# Patient Record
Sex: FEMALE | Race: WHITE | NOT HISPANIC OR LATINO | Employment: FULL TIME | URBAN - NONMETROPOLITAN AREA
[De-identification: names, ages, dates, MRNs, and addresses within clinical notes are randomized per-mention and may not be internally consistent; named-entity substitution may affect disease eponyms.]

---

## 2024-08-23 ENCOUNTER — OFFICE VISIT (OUTPATIENT)
Dept: URGENT CARE | Facility: CLINIC | Age: 24
End: 2024-08-23
Payer: COMMERCIAL

## 2024-08-23 VITALS
OXYGEN SATURATION: 97 % | SYSTOLIC BLOOD PRESSURE: 113 MMHG | BODY MASS INDEX: 38.57 KG/M2 | RESPIRATION RATE: 18 BRPM | DIASTOLIC BLOOD PRESSURE: 78 MMHG | WEIGHT: 240 LBS | TEMPERATURE: 97.7 F | HEIGHT: 66 IN | HEART RATE: 122 BPM

## 2024-08-23 DIAGNOSIS — J06.9 VIRAL URI WITH COUGH: ICD-10-CM

## 2024-08-23 DIAGNOSIS — J02.9 ACUTE PHARYNGITIS, UNSPECIFIED ETIOLOGY: Primary | ICD-10-CM

## 2024-08-23 LAB
POC CORONAVIRUS 2019 BY PCR (COV19): DETECTED
POC FLU A RESULT: NOT DETECTED
POC FLU B RESULT: NOT DETECTED
POC GROUP A STREP, PCR: NOT DETECTED
POC RSV PCR: NOT DETECTED

## 2024-08-23 PROCEDURE — 99213 OFFICE O/P EST LOW 20 MIN: CPT | Performed by: NURSE PRACTITIONER

## 2024-08-23 PROCEDURE — 87651 STREP A DNA AMP PROBE: CPT | Mod: QW | Performed by: NURSE PRACTITIONER

## 2024-08-23 NOTE — PROGRESS NOTES
[facility]  Yuliana TALIB Olmos, APRN-CNP     Visit Note - 8/23/2024 9:50 AM   This note was generated with voice recognition software and may contain errors including spelling, grammar, syntax, and misrecognization of what was dictated.    Patient: Sangita Campbell, MRN: [id number], 24 y.o., female   PCP: Issa Howard PA-C  ------------------------------------  ALLERGIES: No Known Allergies     CURRENT MEDICATIONS:   Current Outpatient Medications   Medication Instructions    busPIRone (BUSPAR) 5 mg, oral, 2 times daily    Enskyce 0.15-0.03 mg tablet 1 tablet, oral, Daily    metoprolol succinate XL (TOPROL-XL) 50 mg, oral, Daily    venlafaxine XR (EFFEXOR-XR) 150 mg, oral, Daily    venlafaxine XR (EFFEXOR-XR) 75 mg, oral, Daily, Do not crush or chew.     ------------------------------------  PAST MEDICAL HX:  Patient Active Problem List   Diagnosis    Acne    Anxiety    Chest discomfort    Chronic fatigue    Cold sore    Depression    Elevated blood pressure reading    Generalized anxiety disorder    Heavy period    Iron deficiency    Iron deficiency anemia    Microcytic anemia    Palpitations    Tachycardia      SURGICAL HX:  History reviewed. No pertinent surgical history.   FAMILY HX:   No pertinent history.   SOCIAL HX:    reports that she has quit smoking. Her smoking use included cigarettes. She has never used smokeless tobacco. Employed- works at a day-hab facility.   ------------------------------------  CHIEF COMPLAINT:   Chief Complaint   Patient presents with    Sore Throat     Sore throat, cough and congestion,fevers, headaches  X 2 days      HISTORY OF PRESENT ILLNESS: The history was obtained from patientBrittnee Quesada is a 24 y.o. female, who presents with a chief complaint of sore throat, a cough (sometimes dry/sometimes productive with green phlegm), headaches, PND, nasal congestion/sinus pressure (clear/yellow mucus), and pressure in both ears - sxs started on Tuesday night. Has  "also had intermittent fevers (Tmax 101.7f). Denies any chills, body aches, abdominal pain, chest pain, wheezing/shortness of breath, rashes, urinary symptoms, nausea/vomiting, and diarrhea. Denies any changes in mental status. No swelling in legs. Appetite is normal but reports it hurts to swallow; is able to drink fluids without difficulty; denies loss of sense of taste or smell. Reports symptoms have gotten worse since onset. Has been taking  Theraflu  without much relief; no other over-the-counter medications or home remedies for symptom management.  A few of her clients at work (works at a [employer] facility) have been ill recently; no other known ill contacts. Has not received the COVID vaccine. Last known COVID infection was in ~2022.  Is a former smoker.  Currently vapes.     REVIEW OF SYSTEMS:  10 systems reviewed negative with exception of history of present illness as listed above.    TODAY'S VITALS: /78   Pulse (!) 122   Temp 36.5 °C (97.7 °F)   Resp 18   Ht 1.676 m (5' 6\")   Wt 109 kg (240 lb)   SpO2 97%   BMI 38.74 kg/m²   Recheck HR: 112 - patient reports has history of Tachycardia - PCP and Cardio are monitoring - reports HR is typically in the 120's.     PHYSICAL EXAMINATION:  General:  Mildly ill-appearing, well nourished female; alert and oriented; in no acute distress. Sitting comfortably on exam table. Non-dyspneic.   Eyes:  Pupils equal, round and reactive to light. No conjunctival erythema; no scleral icterus.   HENT: No frontal or maxillary sinus tenderness; + audible nasal congestion. Airway patent, TMs and ear canals clear/unremarkable bilaterally. Nasal mucosa mildly injected and edematous. Oral mucosa moist. Posterior pharynx mildly injected but without vesicles or oropharyngeal exudate aside from PND. Tonsils 1+ but without exudate. Uvula is midline. Managing oral secretions without difficulty. No trismus.   Neck:  Supple. Mildly tender, mobile anterior cervical lymphadenopathy " bilat. Trachea is midline.  Respiratory:  Respirations easy and unlabored, Breath sounds equal. Lungs are clear to auscultation; no wheezes, rhonchi, or rales; has good air movement throughout. + loose, productive cough noted. Non-dyspneic with ambulation; able to maintain SpO2.  Cardiovascular:  Tachycardic rate, Regular rhythm. Normal S1S2. No m/r/g. No peripheral edema.   Gastrointestinal:  Soft, non-tender, non-distended; no palpable masses or organomegaly. Bowel sounds normoactive.  Musculoskeletal:  Grossly normal; appropriate for age.     Integumentary:  Pink, warm, dry, and intact. No rashes or skin discoloration appreciated. Good skin turgor.  Neurologic:  Alert and oriented, no gross deficits.    Cognition and Speech:  Oriented, Speech clear and coherent.    Psychiatric:  Cooperative, Appropriate mood & affect.       ------------------------------------  Medical Decision Making  LABORATORY or RADIOLOGICAL IMAGING ORDERS/RESULTS:   Strep A/COVID/influenza/RSV PCR tests done - results pending.    IMPRESSION/PLAN:  Course: Worsening; stable    1. Acute pharyngitis, unspecified etiology  2. Viral URI with cough  - POCT Group A Streptococcus, PCR manually resulted  - POCT SARS-COV-2/FLU/RSV PCR SYMPTOMATIC manually resulted    Is tachycardic but reports this is her baseline - following with Cardiology and reports HR is typically in the 120's. Is asymptomatic. Took TheraFlu this AM. No other red flags on exam today. Symptoms consistent with viral URI, but reviewed other potential etiologies, and strep and nasal swab obtained (using proper PPE) for influenza/RSV/COVID-19 testing to err on the side of caution. No antibiotics indicated at this point, but encouraged to continue conservative measures - instructed to push fluids, rest, and to use appropriate over the counter medications as needed for management of symptoms - should avoid decongestants in light of her elevated HR. Advised can review test results on the  portal and office will contact pt within the next 24-48 hours. Reviewed instructions for self-isolation and continued monitoring. Reviewed red flags to monitor for, counseled on potential adverse reactions of treatments, expectations for improvement in sxs, and advised to follow-up with primary care provider in 3-5 days if symptoms persist, or to seek care sooner if worsening or if any additional concerns/red flags develop.  Also urged to continue close follow up with PCP/Cardiology re: tachycardia. Patient agreed with plan of care; questions were encouraged and answered.       ROSE Yen-CNP   Advanced Practice Provider  [facility]

## 2024-08-23 NOTE — LETTER
August 23, 2024     Patient: Sangita Campbell   YOB: 2000   Date of Visit: 8/23/2024       To Whom It May Concern:    Sangita Campbell was seen at [facility] on 8/23/2024. Please excuse Sangita from work/school beginning now and through: 8/24/24 - see below.    ADDITIONAL NOTES AND INSTRUCTIONS  Please self-isolate at home (patient and all household members) until a negative test result is returned. If COVID-19 test is positive, you will need to quarantine until:   - your symptoms are resolving, AND   - your fever has been gone for at least 24 hours, without the use of fever reducing medications.  After quarantining, even if you are feeling better, it is recommended that you use added precautions over the next 5 days. Precautions include: practicing good hygiene measures, wearing a well-fitting mask, keeping a distance from others, and avoiding contact with people who have weakened immune systems.     Flu test, RSV test, and Strep test also done today - If flu test is positive, will need to be feeling better, fever-free x 24 hours, and at least 5 days since onset of symptoms prior to returning to work/school.     If strep test is positive, will need to be on antibiotic x 24 hours AND fever-free x 24 hours prior to returning to work/school.     **If tests are negative, can return to work/school once feeling better and fever-free (without tylenol/ibuprofen) x 24 hours.**    Sincerely,       Yuliana Olmos, APRN-CNP

## 2024-09-18 ENCOUNTER — TELEPHONE (OUTPATIENT)
Dept: PRIMARY CARE | Facility: CLINIC | Age: 24
End: 2024-09-18
Payer: COMMERCIAL

## 2024-09-18 DIAGNOSIS — F41.9 ANXIETY: ICD-10-CM

## 2024-09-18 DIAGNOSIS — F32.A DEPRESSION, UNSPECIFIED DEPRESSION TYPE: ICD-10-CM

## 2024-09-19 RX ORDER — VENLAFAXINE HYDROCHLORIDE 150 MG/1
150 CAPSULE, EXTENDED RELEASE ORAL DAILY
Qty: 90 CAPSULE | Refills: 3 | Status: SHIPPED | OUTPATIENT
Start: 2024-09-19 | End: 2025-09-19

## 2024-10-16 ENCOUNTER — TELEPHONE (OUTPATIENT)
Dept: PRIMARY CARE | Facility: CLINIC | Age: 24
End: 2024-10-16
Payer: COMMERCIAL

## 2024-10-16 DIAGNOSIS — F41.9 ANXIETY: ICD-10-CM

## 2024-10-16 NOTE — TELEPHONE ENCOUNTER
Order Details    Dose: 75 mg Route: oral Frequency: Daily   Dispense Quantity: 90 capsule Refills: 1    Note to Pharmacy: Pt takes this with the venlafaxine 150 mg daily         Sig: Take 1 capsule (75 mg) by mouth once daily. Do not crush or chew.         [facility]. THANK YOU.

## 2024-10-17 RX ORDER — VENLAFAXINE HYDROCHLORIDE 75 MG/1
75 CAPSULE, EXTENDED RELEASE ORAL DAILY
Qty: 90 CAPSULE | Refills: 1 | Status: SHIPPED | OUTPATIENT
Start: 2024-10-17 | End: 2025-10-17

## 2024-11-26 NOTE — PROGRESS NOTES
CHIEF COMPLAINT  Questions regarding BB and pregnancy     HISTORY OF PRESENT ILLNESS    Patient presents and is inquiring about the safety of metoprolol if she would become pregnant. She does report palpations on a daily basis. Her BP is quite high today and patient reports a stressful day at work. She is able to check BP at home.     Cardiovascular hx:    1.Paroxysmal atrial tachycardia:  -Patient previously examined by Dr. Eugene where she was medically managed with toprol XL 50mg daily. If toprol therapy ineffective, can consider switching to propranolol or consider an EPS +/- SVT ablation. In addition to pharmacological interventions, the patient was also instructed to increase her fluid intake to 3L per day, increase aerobic exercise, and increase dietary salt intake  -Currently on Toprol XL 50mg daily       Cardiovascular testin.Echo ()-normal biventricular function         Past Medical, Surgical, and Family History reviewed and updated in chart.     Reviewed all medications by prescribing practitioner or clinical pharmacist (such as prescriptions, OTCs, herbal therapies and supplements) and documented in the medical record.    Past Medical History  Past Medical History:   Diagnosis Date    Other conditions influencing health status     Menstruation    Other specified anxiety disorders 2022    Depression with anxiety       Social History  Social History     Tobacco Use    Smoking status: Every Day    Smokeless tobacco: Never    Tobacco comments:     VAPE   Vaping Use    Vaping status: Every Day    Substances: Nicotine   Substance Use Topics    Alcohol use: Yes     Comment: occasional    Drug use: Not on file       Family History     Family History   Problem Relation Name Age of Onset    Basal cell carcinoma Mother      Supraventricular tachycardia Father      Other (pat) Father      Heart attack Father's Brother      Colon cancer Maternal Grandmother      Stroke Maternal  "Grandmother          Allergies:  No Known Allergies     Outpatient Medications:  Current Outpatient Medications   Medication Instructions    busPIRone (BUSPAR) 5 mg, oral, 2 times daily    Enskyce 0.15-0.03 mg tablet 1 tablet, oral, Daily    metoprolol succinate XL (TOPROL-XL) 50 mg, oral, Daily    metoprolol succinate XL (TOPROL-XL) 50 mg, oral, Daily, Do not crush or chew.    metoprolol succinate XL (TOPROL-XL) 25 mg, oral, Daily, Do not crush or chew.    venlafaxine XR (EFFEXOR-XR) 150 mg, oral, Daily    venlafaxine XR (EFFEXOR-XR) 75 mg, oral, Daily, Do not crush or chew.          Labs:   CMP:  Recent Labs     09/12/23  1539 12/20/22  1049 03/01/22  0940 01/03/22  1406    135*  --  139   K 3.8 4.0  --  3.8    103  --  106   CO2 26 22  --  25   ANIONGAP 13 14  --  12   BUN 14 10  --  7   CREATININE 0.52 0.46*  --  0.55   MG  --   --  2.15  --      Recent Labs     09/12/23  1539 12/20/22  1049 01/03/22  1406   ALBUMIN 4.2 4.2 4.3   ALKPHOS 107 83 87   ALT 52* 31 39   AST 29 29 16   BILITOT 0.4 0.3 0.4     CBC:  Recent Labs     09/12/23  1539 01/10/23  1549 12/20/22  1049 08/09/22  0934 04/04/22  0955   WBC 10.2 8.8 13.5* 8.2 9.3   HGB 14.2 14.8 15.2 14.0 12.2   HCT 44.3 45.2 46.5* 42.4 38.9    365 376 440 510*   MCV 94 89 89 86 75*     COAG: No results for input(s): \"PTT\", \"INR\", \"HAUF\", \"DDIMERVTE\", \"HAPTOGLOBIN\", \"FIBRINOGEN\" in the last 19110 hours.  ABO: No results for input(s): \"ABO\" in the last 78333 hours.  HEME/ENDO:  Recent Labs     12/08/23  1620 09/12/23  1539 01/10/23  1549 08/09/22  0934 05/05/22  0935 04/04/22  0955 03/01/22  0940 01/03/22  1406   FERRITIN  --  33 26  --   --   --  8  --    IRONSAT  --  12* 60* NOT CALC. 9*   < > 5*  --    TSH 3.31  --   --  2.25  --   --   --  1.42    < > = values in this interval not displayed.      CARDIAC: No results for input(s): \"LDH\", \"CKMB\", \"TROPHS\", \"BNP\" in the last 26100 hours.    No lab exists for component: \"CK\", \"CKMBP\"  Recent Labs " "    01/03/22  1406   CHOL 162   LDLF 91   HDL 46.0   TRIG 123     MICRO: No results for input(s): \"ESR\", \"CRP\", \"PROCAL\" in the last 45013 hours.  No results found for the last 90 days.    Notable Studies: imaging personally reviewed   EKG:No results found for this or any previous visit (from the past 4464 hours).  Echocardiogram:   [place] [id number]  Phone [phone], Fax [phone]    TRANSTHORACIC ECHOCARDIOGRAM REPORT      Patient Name:     KORY ARTHUR ELVIRA Reading Physician:   [id number] Maciel Xiong MD  Study Date:       2/21/2022         Referring Physician: [id number] REINA MATSON MD  MRN/PID:          [id number]          PCP:  Accession/Order#: [id number]      Department Location: [facility]  YOB: 2000          Fellow:  Gender:           F                 Nurse:  Admit Date:                         Sonographer:         Karlie Browning RVT, [facility]  Admission Status: Outpatient        Additional Staff:  Height:           167.64 cm         CC Report to:  Weight:           77.11 kg          Study Type:          Echocardiogram  BSA:              1.87 m2  Blood Pressure: 122 /68 mmHg    Diagnosis/ICD: R00.2-Palpitations; R07.9-Chest pain, unspecified  Indication:    CP,Palps  Procedure/CPT: Echo Complete w Full [id number]    Patient History:  Pertinent History: No previous echo.    Study Detail: The following Echo studies were performed: 2D, M-Mode, Doppler and  color flow. Agitated saline used as a contrast agent for  intraseptal flow evaluation. The patient was awake.      PHYSICIAN INTERPRETATION:  Left Ventricle: The left ventricular systolic function is normal, with an estimated ejection fraction of 55-60%. There are no regional wall motion abnormalities. The left ventricular cavity size is normal. Spectral Doppler shows a normal pattern of left ventricular diastolic filling.  Left Atrium: The " left atrium is normal in size. Bubble study is consistent with a patent foramen ovale. Negative contrast in the right atrium suggests bidirectional shunt.  Right Ventricle: The right ventricle is normal in size. There is normal right ventricular global systolic function.  Right Atrium: The right atrium is normal in size.  Aortic Valve: The aortic valve was not well visualized. There is no evidence of aortic valve regurgitation. The peak instantaneous gradient of the aortic valve is 5.1 mmHg. The mean gradient of the aortic valve is 3.0 mmHg. Aortic valve appears open well in systole. Number of leaflets could not be adequately visualized.  Mitral Valve: The mitral valve is normal in structure. There is no evidence of mitral valve regurgitation.  Tricuspid Valve: The tricuspid valve is structurally normal. No evidence of tricuspid regurgitation.  Pulmonic Valve: The pulmonic valve is not well visualized. There is trace pulmonic valve regurgitation.  Pericardium: There is no pericardial effusion noted.  Aorta: The aortic root is normal.  Systemic Veins: The inferior vena cava appears to be of normal size. There is IVC inspiratory collapse greater than 50%.      CONCLUSIONS:  1. The left ventricular systolic function is normal with a 55-60% estimated ejection fraction.  2. Bubble study is consistent with a patent foramen ovale. Negative contrast in the right atrium suggests bidirectional shunt.    QUANTITATIVE DATA SUMMARY:  2D MEASUREMENTS:  Normal Ranges:  Ao Root d:     2.60 cm   (2.0-3.7cm)  LAs:           3.40 cm   (2.7-4.0cm)  IVSd:          0.79 cm   (0.6-1.1cm)  LVPWd:         0.73 cm   (0.6-1.1cm)  LVIDd:         5.27 cm   (3.9-5.9cm)  LVIDs:         3.57 cm  LV Mass Index: 74.3 g/m2  LV % FS        32.3 %    LA VOLUME:  Normal Ranges:  LA Vol A4C:        29.2 ml    (22+/-6mL/m2)  LA Vol A2C:        71.5 ml  LA Vol BP:         49.1 ml  LA Vol Index A4C:  15.6ml/m2  LA Vol Index A2C:  38.3 ml/m2  LA Vol Index  BP:   26.3 ml/m2  LA Area A4C:       12.5 cm2  LA Area A2C:       18.2 cm2  LA Major Axis A4C: 4.6 cm  LA Major Axis A2C: 3.9 cm  LA Volume Index:   36.2 ml/m2  LA Vol A4C:        23.3 ml  LA Vol A2C:        67.7 ml    M-MODE MEASUREMENTS:  Normal Ranges:  AoV Exc: 2.10 cm (1.5-2.5cm)    AORTA MEASUREMENTS:  Normal Ranges:  AoV Exc: 2.10 cm (1.5-2.5cm)    LV SYSTOLIC FUNCTION BY 2D PLANIMETRY (MOD):  Normal Ranges:  EF-A4C View: 59.6 % (>55%)  EF-A2C View: 52.4 %  EF-Biplane:  55.6 %    LV DIASTOLIC FUNCTION:  Normal Ranges:  MV Peak E:    0.77 m/s (0.7-1.2 m/s)  MV Peak A:    0.75 m/s (0.42-0.7 m/s)  E/A Ratio:    1.03     (1.0-2.2)  MV e'         0.12 m/s (>8.0)  MV lateral e' 0.16 m/s  MV medial e'  0.12 m/s  E/e' Ratio:   6.43     (<8.0)    MITRAL VALVE:  Normal Ranges:  MV DT: 183 msec (150-240msec)    AORTIC VALVE:  Normal Ranges:  AoV Vmax:                1.13 m/s (<1.7m/s)  AoV Peak P.1 mmHg (<20mmHg)  AoV Mean PG:             3.0 mmHg (1.7-11.5mmHg)  LVOT Max Ruel:            0.75 m/s (<1.1m/s)  AoV VTI:                 25.80 cm (18-25cm)  LVOT VTI:                15.60 cm  LVOT Diameter:           2.10 cm  (1.8-2.4cm)  AoV Area, VTI:           2.09 cm2 (2.5-5.5cm2)  AoV Area,Vmax:           2.31 cm2 (2.5-4.5cm2)  AoV Dimensionless Index: 0.60    RIGHT VENTRICLE:  RV 1   3.89 cm  RV 2   3.13 cm  RV 3   6.69 cm  TAPSE: 16.5 mm    PULMONIC VALVE:  Normal Ranges:  PV Accel Time: 141 msec (>120ms)  PV Max Ruel:    1.3 m/s  (0.6-0.9m/s)  PV Max P.2 mmHg      [id number] Maciel Xiong MD  Electronically signed on 2022 at 12:00:08 PM      Stress Testing: No results found for this or any previous visit from the past 1825 days.    Cardiac Catheterization: No results found for this or any previous visit from the past 1825 days.  No results found for this or any previous visit from the past 3650 days.         REVIEW OF SYSTEMS  A 10-point system review was completed and was negative except as  noted in the HPI.      VITALS  Vitals:    24 1530   BP: (!) 142/102   Pulse: (!) 112   SpO2: 99%       PHYSICAL EXAM  General: awake, alert and oriented. No acute distress.   Skin: Skin is warm, dry and intact without rashes or lesions.  HEENT: normocephalic, atraumatic; conjunctivae are clear without exudates or hemorrhage. Sclera is non-icteric. Eyelids are normal in appearance without swelling or lesions. Hearing intact. Nares are patent bilaterally. Moist mucous membranes.   Cardiovascular: heart rate and rhythm are normal. No murmurs, gallops, or rubs are auscultated. S1 and S2 are heard and are of normal intensity.  Respiratory: bilateral lung sounds clear to auscultations without rales, rhonchi, or wheezes. No accessory muscle use or stridor  Musculoskeletal: ROM intact, no deformities  Extremities: no swelling or erythema  Neurological: no focal deficits; gait steady  Psychiatric: appropriate mood and affect; good judgment and insight          ASSESSMENT AND PLAN  Assessment/Plan   Diagnoses and all orders for this visit:  Encounter for medication counseling  Palpitations  -We discussed the safety profile of metoprolol in pregnancy which comes back as a pregnancy category C with a moderately low risk of accumulation in the ; medication does cross the placenta. The medication can be taken with pregnancy with relatively low risk  -Given she still experiences palpitations, we will increase her Toprol to 75mg daily.   -She may have some underlying POTS given her spike in her HR with position changes. We discussed conservative treatment and avoiding triggers which she verbalized understanding   -IOEKG showing NSR with rate of 92 bpm   -Patient is hypertensive today. I want patent to monitor BP at home, record results, and the office will call her in 1 week for me to evaluate readings. If her readings remain above goal, medication may be warranted          RTC: PRN      Thank you for allowing me to  participate in the care of this patient. Please reach me out if you have any questions or if you need any clarifications regarding the patient's care.    Izabela Damian DNP, APRN, FNP-C  Division of Cardiovascular Medicine  [facility]

## 2024-11-27 ENCOUNTER — APPOINTMENT (OUTPATIENT)
Dept: CARDIOLOGY | Facility: CLINIC | Age: 24
End: 2024-11-27
Payer: COMMERCIAL

## 2024-11-27 VITALS
HEART RATE: 112 BPM | BODY MASS INDEX: 40.77 KG/M2 | DIASTOLIC BLOOD PRESSURE: 102 MMHG | OXYGEN SATURATION: 99 % | HEIGHT: 65 IN | WEIGHT: 244.7 LBS | SYSTOLIC BLOOD PRESSURE: 142 MMHG

## 2024-11-27 DIAGNOSIS — Z71.89 ENCOUNTER FOR MEDICATION COUNSELING: Primary | ICD-10-CM

## 2024-11-27 DIAGNOSIS — R00.2 PALPITATIONS: ICD-10-CM

## 2024-11-27 PROCEDURE — 99214 OFFICE O/P EST MOD 30 MIN: CPT

## 2024-11-27 PROCEDURE — 93000 ELECTROCARDIOGRAM COMPLETE: CPT

## 2024-11-27 PROCEDURE — 3008F BODY MASS INDEX DOCD: CPT

## 2024-11-27 RX ORDER — METOPROLOL SUCCINATE 25 MG/1
25 TABLET, EXTENDED RELEASE ORAL DAILY
Qty: 90 TABLET | Refills: 3 | Status: SHIPPED | OUTPATIENT
Start: 2024-11-27 | End: 2025-11-27

## 2024-11-27 RX ORDER — METOPROLOL SUCCINATE 50 MG/1
50 TABLET, EXTENDED RELEASE ORAL DAILY
Qty: 90 TABLET | Refills: 3 | Status: SHIPPED | OUTPATIENT
Start: 2024-11-27 | End: 2025-11-27

## 2025-01-23 ENCOUNTER — OFFICE VISIT (OUTPATIENT)
Dept: PRIMARY CARE | Facility: CLINIC | Age: 25
End: 2025-01-23
Payer: COMMERCIAL

## 2025-01-23 VITALS
SYSTOLIC BLOOD PRESSURE: 128 MMHG | OXYGEN SATURATION: 97 % | DIASTOLIC BLOOD PRESSURE: 70 MMHG | HEART RATE: 123 BPM | BODY MASS INDEX: 39.82 KG/M2 | HEIGHT: 65 IN | WEIGHT: 239 LBS | TEMPERATURE: 97 F

## 2025-01-23 DIAGNOSIS — J06.9 UPPER RESPIRATORY TRACT INFECTION, UNSPECIFIED TYPE: ICD-10-CM

## 2025-01-23 DIAGNOSIS — J01.40 ACUTE NON-RECURRENT PANSINUSITIS: Primary | ICD-10-CM

## 2025-01-23 PROCEDURE — 87636 SARSCOV2 & INF A&B AMP PRB: CPT

## 2025-01-23 PROCEDURE — 99213 OFFICE O/P EST LOW 20 MIN: CPT

## 2025-01-23 PROCEDURE — 3008F BODY MASS INDEX DOCD: CPT

## 2025-01-23 PROCEDURE — 1036F TOBACCO NON-USER: CPT

## 2025-01-23 RX ORDER — AZITHROMYCIN 250 MG/1
TABLET, FILM COATED ORAL
Qty: 6 TABLET | Refills: 0 | Status: SHIPPED | OUTPATIENT
Start: 2025-01-23 | End: 2025-01-28

## 2025-01-23 ASSESSMENT — ENCOUNTER SYMPTOMS
GASTROINTESTINAL NEGATIVE: 1
CONSTITUTIONAL NEGATIVE: 1
CARDIOVASCULAR NEGATIVE: 1
RESPIRATORY NEGATIVE: 1

## 2025-01-23 ASSESSMENT — PATIENT HEALTH QUESTIONNAIRE - PHQ9
2. FEELING DOWN, DEPRESSED OR HOPELESS: NOT AT ALL
SUM OF ALL RESPONSES TO PHQ9 QUESTIONS 1 AND 2: 0
1. LITTLE INTEREST OR PLEASURE IN DOING THINGS: NOT AT ALL

## 2025-01-23 NOTE — PROGRESS NOTES
"Subjective   Patient ID: Sangita Campbell is a 24 y.o. female who presents for pt c/o HA, sore throat, congestion x 3 days.    HPI   3 days of symptoms, started as severe sore throat, sinus congestion, head pressure, HA.     Review of Systems   Constitutional: Negative.    Respiratory: Negative.     Cardiovascular: Negative.    Gastrointestinal: Negative.        Objective   /70   Pulse (!) 123   Temp 36.1 °C (97 °F)   Ht 1.651 m (5' 5\")   Wt 108 kg (239 lb)   SpO2 97%   BMI 39.77 kg/m²     Physical Exam  Constitutional:       General: She is not in acute distress.     Appearance: Normal appearance. She is not ill-appearing.   HENT:      Head: Normocephalic and atraumatic.      Nose: Congestion present.      Mouth/Throat:      Pharynx: Posterior oropharyngeal erythema present. No oropharyngeal exudate.   Eyes:      Extraocular Movements: Extraocular movements intact.      Conjunctiva/sclera: Conjunctivae normal.   Cardiovascular:      Rate and Rhythm: Normal rate.   Pulmonary:      Effort: Pulmonary effort is normal.   Abdominal:      General: There is no distension.   Musculoskeletal:         General: Normal range of motion.      Cervical back: Normal range of motion.   Skin:     General: Skin is warm and dry.   Neurological:      General: No focal deficit present.      Mental Status: She is alert and oriented to person, place, and time.   Psychiatric:         Mood and Affect: Mood normal.         Behavior: Behavior normal.         Thought Content: Thought content normal.         Judgment: Judgment normal.         Assessment/Plan        Swabbed for COVID/flu  Rx Zpack, advised wait until swab results back  Otc symptomatic control  Follow up prn  "

## 2025-01-24 LAB
FLUAV RNA RESP QL NAA+PROBE: NOT DETECTED
FLUBV RNA RESP QL NAA+PROBE: NOT DETECTED
SARS-COV-2 RNA RESP QL NAA+PROBE: NOT DETECTED

## 2025-03-06 ENCOUNTER — APPOINTMENT (OUTPATIENT)
Dept: PRIMARY CARE | Facility: CLINIC | Age: 25
End: 2025-03-06
Payer: COMMERCIAL

## 2025-03-06 VITALS
WEIGHT: 239 LBS | DIASTOLIC BLOOD PRESSURE: 82 MMHG | HEIGHT: 65 IN | SYSTOLIC BLOOD PRESSURE: 122 MMHG | BODY MASS INDEX: 39.82 KG/M2 | OXYGEN SATURATION: 99 % | HEART RATE: 95 BPM

## 2025-03-06 DIAGNOSIS — E61.1 IRON DEFICIENCY: Primary | ICD-10-CM

## 2025-03-06 DIAGNOSIS — R00.0 TACHYCARDIA: ICD-10-CM

## 2025-03-06 DIAGNOSIS — F41.9 ANXIETY: ICD-10-CM

## 2025-03-06 PROCEDURE — 1036F TOBACCO NON-USER: CPT

## 2025-03-06 PROCEDURE — 99214 OFFICE O/P EST MOD 30 MIN: CPT

## 2025-03-06 PROCEDURE — 3008F BODY MASS INDEX DOCD: CPT

## 2025-03-06 RX ORDER — VENLAFAXINE HYDROCHLORIDE 37.5 MG/1
37.5 CAPSULE, EXTENDED RELEASE ORAL DAILY
Qty: 90 CAPSULE | Refills: 1 | Status: SHIPPED | OUTPATIENT
Start: 2025-03-06 | End: 2025-09-02

## 2025-03-06 RX ORDER — BUSPIRONE HYDROCHLORIDE 5 MG/1
5 TABLET ORAL 2 TIMES DAILY
Qty: 180 TABLET | Refills: 3 | Status: SHIPPED | OUTPATIENT
Start: 2025-03-06 | End: 2026-03-06

## 2025-03-06 RX ORDER — VENLAFAXINE HYDROCHLORIDE 75 MG/1
75 CAPSULE, EXTENDED RELEASE ORAL DAILY
Qty: 90 CAPSULE | Refills: 1 | Status: SHIPPED | OUTPATIENT
Start: 2025-03-06 | End: 2025-09-02

## 2025-03-06 RX ORDER — NYSTATIN 100000 [USP'U]/ML
SUSPENSION ORAL
COMMUNITY
Start: 2025-03-02

## 2025-03-06 ASSESSMENT — ENCOUNTER SYMPTOMS
CARDIOVASCULAR NEGATIVE: 1
RESPIRATORY NEGATIVE: 1
GASTROINTESTINAL NEGATIVE: 1
CONSTITUTIONAL NEGATIVE: 1

## 2025-03-06 ASSESSMENT — PATIENT HEALTH QUESTIONNAIRE - PHQ9
SUM OF ALL RESPONSES TO PHQ9 QUESTIONS 1 AND 2: 0
1. LITTLE INTEREST OR PLEASURE IN DOING THINGS: NOT AT ALL
2. FEELING DOWN, DEPRESSED OR HOPELESS: NOT AT ALL

## 2025-03-06 NOTE — PROGRESS NOTES
"Subjective   Patient ID: Sangita Campbell is a 25 y.o. female who presents for pt here for med check.    HPI   ANXIETY/DEPRESSION: Currently weaning off Effexor, has been taking 150mg daily for 1 month now. Buspar still helpful. SHANE 7/PHQ 2.  TACHYCARDIA/HTN: Has seen Izabela and increased to 75 Toprol daily, she is tracking BP currently. Possibly underlying POTs, staying well hydrated, advised aerobic exercise. Just started her increased dose about 2 days ago, rate and pressure seem better today.  LOW IRON: Chronic, difficulty to tolerate iron, taking gummies currently.     Following with GYN in [place], she does have heavy menstruals, vaginal US unremarkable, needs to get second opinion.    Review of Systems   Constitutional: Negative.    Respiratory: Negative.     Cardiovascular: Negative.    Gastrointestinal: Negative.        Objective   /82   Pulse 95   Ht 1.651 m (5' 5\")   Wt 108 kg (239 lb)   SpO2 99%   BMI 39.77 kg/m²     Physical Exam  Constitutional:       General: She is not in acute distress.     Appearance: Normal appearance. She is not ill-appearing.   HENT:      Head: Normocephalic and atraumatic.   Eyes:      Extraocular Movements: Extraocular movements intact.      Conjunctiva/sclera: Conjunctivae normal.   Cardiovascular:      Rate and Rhythm: Normal rate.   Pulmonary:      Effort: Pulmonary effort is normal.   Abdominal:      General: There is no distension.   Musculoskeletal:         General: Normal range of motion.      Cervical back: Normal range of motion.   Skin:     General: Skin is warm and dry.   Neurological:      General: No focal deficit present.      Mental Status: She is alert and oriented to person, place, and time.   Psychiatric:         Mood and Affect: Mood normal.         Behavior: Behavior normal.         Thought Content: Thought content normal.         Judgment: Judgment normal.         Assessment/Plan        Nonfasting labs today.  Continue wean off Effexor, go to " 112.5mg daily.  No other medication changes today.  Follow up 3 months or sooner prn.

## 2025-03-07 LAB
ALBUMIN SERPL-MCNC: 3.9 G/DL (ref 3.6–5.1)
ALP SERPL-CCNC: 91 U/L (ref 31–125)
ALT SERPL-CCNC: 33 U/L (ref 6–29)
ANION GAP SERPL CALCULATED.4IONS-SCNC: 10 MMOL/L (CALC) (ref 7–17)
AST SERPL-CCNC: 28 U/L (ref 10–30)
BILIRUB SERPL-MCNC: 0.3 MG/DL (ref 0.2–1.2)
BUN SERPL-MCNC: 15 MG/DL (ref 7–25)
CALCIUM SERPL-MCNC: 8.8 MG/DL (ref 8.6–10.2)
CHLORIDE SERPL-SCNC: 104 MMOL/L (ref 98–110)
CO2 SERPL-SCNC: 24 MMOL/L (ref 20–32)
CREAT SERPL-MCNC: 0.4 MG/DL (ref 0.5–0.96)
EGFRCR SERPLBLD CKD-EPI 2021: 142 ML/MIN/1.73M2
ERYTHROCYTE [DISTWIDTH] IN BLOOD BY AUTOMATED COUNT: 13.1 % (ref 11–15)
FERRITIN SERPL-MCNC: 25 NG/ML (ref 16–154)
GLUCOSE SERPL-MCNC: 88 MG/DL (ref 65–139)
HCT VFR BLD AUTO: 42.8 % (ref 35–45)
HGB BLD-MCNC: 14.1 G/DL (ref 11.7–15.5)
IRON SATN MFR SERPL: 11 % (CALC) (ref 16–45)
IRON SERPL-MCNC: 44 MCG/DL (ref 40–190)
MCH RBC QN AUTO: 27.8 PG (ref 27–33)
MCHC RBC AUTO-ENTMCNC: 32.9 G/DL (ref 32–36)
MCV RBC AUTO: 84.4 FL (ref 80–100)
PLATELET # BLD AUTO: 428 THOUSAND/UL (ref 140–400)
PMV BLD REES-ECKER: 9 FL (ref 7.5–12.5)
POTASSIUM SERPL-SCNC: 4 MMOL/L (ref 3.5–5.3)
PROT SERPL-MCNC: 7.1 G/DL (ref 6.1–8.1)
RBC # BLD AUTO: 5.07 MILLION/UL (ref 3.8–5.1)
SODIUM SERPL-SCNC: 138 MMOL/L (ref 135–146)
TIBC SERPL-MCNC: 417 MCG/DL (CALC) (ref 250–450)
WBC # BLD AUTO: 9.2 THOUSAND/UL (ref 3.8–10.8)

## 2025-05-15 ENCOUNTER — APPOINTMENT (OUTPATIENT)
Dept: PRIMARY CARE | Facility: CLINIC | Age: 25
End: 2025-05-15
Payer: COMMERCIAL

## 2025-05-15 VITALS
WEIGHT: 238 LBS | DIASTOLIC BLOOD PRESSURE: 82 MMHG | BODY MASS INDEX: 39.65 KG/M2 | HEIGHT: 65 IN | HEART RATE: 92 BPM | SYSTOLIC BLOOD PRESSURE: 124 MMHG | OXYGEN SATURATION: 96 %

## 2025-05-15 DIAGNOSIS — R00.2 PALPITATIONS: ICD-10-CM

## 2025-05-15 DIAGNOSIS — E61.1 IRON DEFICIENCY: Primary | ICD-10-CM

## 2025-05-15 DIAGNOSIS — R00.0 TACHYCARDIA: ICD-10-CM

## 2025-05-15 DIAGNOSIS — F41.9 ANXIETY: ICD-10-CM

## 2025-05-15 DIAGNOSIS — F32.A DEPRESSION, UNSPECIFIED DEPRESSION TYPE: ICD-10-CM

## 2025-05-15 PROCEDURE — 99214 OFFICE O/P EST MOD 30 MIN: CPT

## 2025-05-15 PROCEDURE — 3008F BODY MASS INDEX DOCD: CPT

## 2025-05-15 PROCEDURE — 1036F TOBACCO NON-USER: CPT

## 2025-05-15 RX ORDER — VENLAFAXINE HYDROCHLORIDE 37.5 MG/1
37.5 CAPSULE, EXTENDED RELEASE ORAL DAILY
Qty: 90 CAPSULE | Refills: 1 | Status: SHIPPED | OUTPATIENT
Start: 2025-05-15 | End: 2025-11-11

## 2025-05-15 RX ORDER — METOPROLOL SUCCINATE 50 MG/1
50 TABLET, EXTENDED RELEASE ORAL DAILY
Qty: 90 TABLET | Refills: 3 | Status: SHIPPED | OUTPATIENT
Start: 2025-05-15 | End: 2026-05-15

## 2025-05-15 RX ORDER — METOPROLOL SUCCINATE 25 MG/1
25 TABLET, EXTENDED RELEASE ORAL DAILY
Qty: 90 TABLET | Refills: 3 | Status: SHIPPED | OUTPATIENT
Start: 2025-05-15 | End: 2026-05-15

## 2025-05-15 RX ORDER — BUSPIRONE HYDROCHLORIDE 10 MG/1
10 TABLET ORAL 3 TIMES DAILY
Qty: 270 TABLET | Refills: 3 | Status: SHIPPED | OUTPATIENT
Start: 2025-05-15 | End: 2026-05-15

## 2025-05-15 RX ORDER — HYDROXYZINE HYDROCHLORIDE 10 MG/1
10 TABLET, FILM COATED ORAL DAILY PRN
Qty: 10 TABLET | Refills: 0 | Status: SHIPPED | OUTPATIENT
Start: 2025-05-15 | End: 2025-05-25

## 2025-05-15 ASSESSMENT — ENCOUNTER SYMPTOMS
CARDIOVASCULAR NEGATIVE: 1
CONSTITUTIONAL NEGATIVE: 1
RESPIRATORY NEGATIVE: 1
GASTROINTESTINAL NEGATIVE: 1

## 2025-05-15 NOTE — PROGRESS NOTES
"Subjective   Patient ID: Sangita Campbell is a 25 y.o. female who presents for pt here for med check  (Pt here to discuss medication change for anxiety ).    HPI   ANXIETY/DEPRESSION: Currently weaning off Effexor, has been taking 75mg daily for about 1 month now. Has been having significantly increased anxiety as she is weaning off Effexor. SHANE 18. Buspar minimally helpful at current dose.  TACHYCARDIA/HTN: Has seen Izabela and increased to 75 Toprol daily, she is tracking BP currently. Possibly underlying POTs, staying well hydrated, advised aerobic exercise. Just started her increased dose about 2 days ago, rate and pressure seem better today.  LOW IRON: Chronic, difficulty to tolerate iron, taking gummies currently.    Will have trip soon and lots of anxiety with riding in car.     Following with GYN in [place], she does have heavy menstruals, vaginal US unremarkable, needs to get second opinion.    Review of Systems   Constitutional: Negative.    Respiratory: Negative.     Cardiovascular: Negative.    Gastrointestinal: Negative.        Objective   /82   Pulse 92   Ht 1.651 m (5' 5\")   Wt 108 kg (238 lb)   SpO2 96%   BMI 39.61 kg/m²     Physical Exam  Constitutional:       General: She is not in acute distress.     Appearance: Normal appearance. She is not ill-appearing.   HENT:      Head: Normocephalic and atraumatic.   Eyes:      Extraocular Movements: Extraocular movements intact.      Conjunctiva/sclera: Conjunctivae normal.   Cardiovascular:      Rate and Rhythm: Normal rate.   Pulmonary:      Effort: Pulmonary effort is normal.   Abdominal:      General: There is no distension.   Musculoskeletal:         General: Normal range of motion.      Cervical back: Normal range of motion.   Skin:     General: Skin is warm and dry.   Neurological:      General: No focal deficit present.      Mental Status: She is alert and oriented to person, place, and time.   Psychiatric:         Mood and Affect: Mood " normal.         Behavior: Behavior normal.         Thought Content: Thought content normal.         Judgment: Judgment normal.         Assessment/Plan        Increase Buspar to 30mg daily.  Continue Effexor wean.  Rx hydroxyzine prn travel anxiety.  No other medication changes today.  Will recheck iron levels at follow up.  Follow up 3 months or sooner prn.

## 2025-06-20 ENCOUNTER — APPOINTMENT (OUTPATIENT)
Dept: PRIMARY CARE | Facility: CLINIC | Age: 25
End: 2025-06-20
Payer: COMMERCIAL

## 2025-07-30 ENCOUNTER — OFFICE VISIT (OUTPATIENT)
Dept: PRIMARY CARE | Facility: CLINIC | Age: 25
End: 2025-07-30
Payer: COMMERCIAL

## 2025-07-30 VITALS
OXYGEN SATURATION: 98 % | BODY MASS INDEX: 40.62 KG/M2 | HEART RATE: 108 BPM | DIASTOLIC BLOOD PRESSURE: 77 MMHG | HEIGHT: 65 IN | SYSTOLIC BLOOD PRESSURE: 118 MMHG | WEIGHT: 243.8 LBS

## 2025-07-30 DIAGNOSIS — F32.A DEPRESSION, UNSPECIFIED DEPRESSION TYPE: ICD-10-CM

## 2025-07-30 DIAGNOSIS — F41.9 ANXIETY: ICD-10-CM

## 2025-07-30 DIAGNOSIS — Z20.828 HERPES EXPOSURE: Primary | ICD-10-CM

## 2025-07-30 PROCEDURE — 1036F TOBACCO NON-USER: CPT | Performed by: NURSE PRACTITIONER

## 2025-07-30 PROCEDURE — 99214 OFFICE O/P EST MOD 30 MIN: CPT | Performed by: NURSE PRACTITIONER

## 2025-07-30 PROCEDURE — 3008F BODY MASS INDEX DOCD: CPT | Performed by: NURSE PRACTITIONER

## 2025-07-30 RX ORDER — SERTRALINE HYDROCHLORIDE 25 MG/1
25 TABLET, FILM COATED ORAL DAILY
Qty: 30 TABLET | Refills: 1 | Status: SHIPPED | OUTPATIENT
Start: 2025-07-30 | End: 2025-09-28

## 2025-07-30 ASSESSMENT — ENCOUNTER SYMPTOMS
NAUSEA: 0
DIARRHEA: 0
DYSPHORIC MOOD: 1
FEVER: 0
ABDOMINAL PAIN: 0
PALPITATIONS: 0
NERVOUS/ANXIOUS: 1
VOMITING: 0
ABDOMINAL DISTENTION: 0
SHORTNESS OF BREATH: 0
CONSTIPATION: 0
WHEEZING: 0
COUGH: 0

## 2025-07-30 ASSESSMENT — ANXIETY QUESTIONNAIRES
3. WORRYING TOO MUCH ABOUT DIFFERENT THINGS: NEARLY EVERY DAY
5. BEING SO RESTLESS THAT IT IS HARD TO SIT STILL: NEARLY EVERY DAY
4. TROUBLE RELAXING: NEARLY EVERY DAY
6. BECOMING EASILY ANNOYED OR IRRITABLE: NEARLY EVERY DAY
GAD7 TOTAL SCORE: 21
2. NOT BEING ABLE TO STOP OR CONTROL WORRYING: NEARLY EVERY DAY
1. FEELING NERVOUS, ANXIOUS, OR ON EDGE: NEARLY EVERY DAY
IF YOU CHECKED OFF ANY PROBLEMS ON THIS QUESTIONNAIRE, HOW DIFFICULT HAVE THESE PROBLEMS MADE IT FOR YOU TO DO YOUR WORK, TAKE CARE OF THINGS AT HOME, OR GET ALONG WITH OTHER PEOPLE: EXTREMELY DIFFICULT
7. FEELING AFRAID AS IF SOMETHING AWFUL MIGHT HAPPEN: NEARLY EVERY DAY

## 2025-07-30 NOTE — PROGRESS NOTES
Subjective   Patient ID: Sangita Campbell is a 25 y.o. female who presents for Stopped medication (Effexor: having mood swings, tired, anxiety wants to see psychiatrist for medication management. [id number]:21).  24 yo female patient presents for depression/anxiety.  States she tapered herself off of Effexor but she continues with the symptoms.  Denies suicidal or homicidal thoughts or ideas.  Denies any plans.  States she believes she took Zoloft when she was younger.  She would like to get pregnant in the future.  She is tearful today.  States she found out she was exposed to previous boyfriend who has herpes and she would like tested today.  She has no symptoms of herpes or breakouts and has been with her  x 3 years.  States her  has no S&S of herpes either.  She does not feel Buspar helps her anxiety and Atarax makes her drowsy.          Review of Systems   Constitutional:  Negative for fever.   Respiratory:  Negative for cough, shortness of breath and wheezing.    Cardiovascular:  Negative for chest pain and palpitations.   Gastrointestinal:  Negative for abdominal distention, abdominal pain, constipation, diarrhea, nausea and vomiting.   Psychiatric/Behavioral:  Positive for dysphoric mood. The patient is nervous/anxious.        Objective   Physical Exam  Vitals and nursing note reviewed.   Constitutional:       General: She is not in acute distress.  HENT:      Head: Normocephalic.   Neck:      Comments: No thyromegaly noted.  Pt states she has no family history of thyroid disease except in cousin.    Cardiovascular:      Rate and Rhythm: Normal rate and regular rhythm.      Pulses: Normal pulses.      Heart sounds: Normal heart sounds. No murmur heard.     No friction rub. No gallop.   Pulmonary:      Effort: Pulmonary effort is normal.      Breath sounds: Normal breath sounds. No wheezing, rhonchi or rales.   Abdominal:      General: Bowel sounds are normal.     Musculoskeletal:      Right lower  "leg: No edema.      Left lower leg: No edema.   Lymphadenopathy:      Cervical: No cervical adenopathy.     Skin:     General: Skin is warm and dry.     Neurological:      Mental Status: She is alert.     Psychiatric:         Mood and Affect: Mood is depressed. Affect is tearful.         Speech: Speech normal.         Behavior: Behavior is cooperative.         Thought Content: Thought content does not include homicidal or suicidal ideation. Thought content does not include homicidal or suicidal plan.      Comments: Tearful       /77   Pulse 108   Ht 1.651 m (5' 5\")   Wt 111 kg (243 lb 12.8 oz)   LMP 07/21/2025   SpO2 98%   BMI 40.57 kg/m²     Current Medications[1]   Medical History[2]   Surgical History[3]     Family History[4]     Assessment/Plan   Problem List Items Addressed This Visit       Anxiety    Relevant Medications    sertraline (Zoloft) 25 mg tablet    Depression    Relevant Medications    sertraline (Zoloft) 25 mg tablet    Other Relevant Orders    TSH with reflex to Free T4 if abnormal     Other Visit Diagnoses         Herpes exposure    -  Primary    Relevant Orders    [facility] HSV, TYPE 1 AND 2 DNA, QL REAL TIME PCR        Follow up in 4 wks.           [1]   Current Outpatient Medications:     busPIRone (Buspar) 10 mg tablet, Take 1 tablet (10 mg) by mouth 3 times a day., Disp: 270 tablet, Rfl: 3    Enskyce 0.15-0.03 mg tablet, Take 1 tablet by mouth once daily., Disp: , Rfl:     hydrOXYzine HCL (Atarax) 10 mg tablet, Take 1 tablet (10 mg) by mouth once daily as needed for anxiety for up to 10 days., Disp: 10 tablet, Rfl: 0    metoprolol succinate XL (Toprol-XL) 25 mg 24 hr tablet, Take 1 tablet (25 mg) by mouth once daily. Do not crush or chew., Disp: 90 tablet, Rfl: 3    metoprolol succinate XL (Toprol-XL) 50 mg 24 hr tablet, Take 1 tablet (50 mg) by mouth once daily. Do not crush or chew., Disp: 90 tablet, Rfl: 3    sertraline (Zoloft) 25 mg tablet, Take 1 tablet (25 mg) by mouth once " daily., Disp: 30 tablet, Rfl: 1    venlafaxine XR (Effexor-XR) 37.5 mg 24 hr capsule, Take 1 capsule (37.5 mg) by mouth once daily. Do not crush or chew. (Patient not taking: Reported on 7/30/2025), Disp: 90 capsule, Rfl: 1    venlafaxine XR (Effexor-XR) 75 mg 24 hr capsule, Take 1 capsule (75 mg) by mouth once daily. Do not crush or chew. (Patient not taking: Reported on 7/30/2025), Disp: 90 capsule, Rfl: 1  [2]   Past Medical History:  Diagnosis Date    Other conditions influencing health status     Menstruation    Other specified anxiety disorders 08/09/2022    Depression with anxiety   [3] History reviewed. No pertinent surgical history.  [4]   Family History  Problem Relation Name Age of Onset    Basal cell carcinoma Mother      Supraventricular tachycardia Father      Other (pat) Father      Heart attack Father's Brother      Colon cancer Maternal Grandmother      Stroke Maternal Grandmother

## 2025-07-31 LAB — TSH SERPL-ACNC: 1.37 MIU/L

## 2025-08-04 ENCOUNTER — APPOINTMENT (OUTPATIENT)
Dept: PRIMARY CARE | Facility: CLINIC | Age: 25
End: 2025-08-04
Payer: COMMERCIAL

## 2025-08-08 LAB
HSV1 DNA SPEC QL NAA+PROBE: NOT DETECTED
HSV2 DNA SPEC QL NAA+PROBE: NOT DETECTED
SPECIMEN SOURCE: NORMAL

## 2025-08-15 ENCOUNTER — APPOINTMENT (OUTPATIENT)
Dept: PRIMARY CARE | Facility: CLINIC | Age: 25
End: 2025-08-15
Payer: COMMERCIAL

## 2025-08-19 ENCOUNTER — TELEPHONE (OUTPATIENT)
Dept: PRIMARY CARE | Facility: CLINIC | Age: 25
End: 2025-08-19
Payer: COMMERCIAL

## 2025-08-28 ENCOUNTER — APPOINTMENT (OUTPATIENT)
Dept: PRIMARY CARE | Facility: CLINIC | Age: 25
End: 2025-08-28
Payer: COMMERCIAL

## 2025-08-28 VITALS
HEART RATE: 99 BPM | SYSTOLIC BLOOD PRESSURE: 122 MMHG | BODY MASS INDEX: 41.65 KG/M2 | HEIGHT: 65 IN | OXYGEN SATURATION: 100 % | WEIGHT: 250 LBS | DIASTOLIC BLOOD PRESSURE: 80 MMHG

## 2025-08-28 DIAGNOSIS — F41.9 ANXIETY: ICD-10-CM

## 2025-08-28 DIAGNOSIS — R00.2 PALPITATIONS: ICD-10-CM

## 2025-08-28 DIAGNOSIS — Z11.3 ROUTINE SCREENING FOR STI (SEXUALLY TRANSMITTED INFECTION): Primary | ICD-10-CM

## 2025-08-28 DIAGNOSIS — E61.1 IRON DEFICIENCY: ICD-10-CM

## 2025-08-28 DIAGNOSIS — F32.A DEPRESSION, UNSPECIFIED DEPRESSION TYPE: ICD-10-CM

## 2025-08-28 PROCEDURE — 1036F TOBACCO NON-USER: CPT

## 2025-08-28 PROCEDURE — 99214 OFFICE O/P EST MOD 30 MIN: CPT

## 2025-08-28 PROCEDURE — 3008F BODY MASS INDEX DOCD: CPT

## 2025-08-28 RX ORDER — FLUOXETINE 20 MG/1
20 CAPSULE ORAL DAILY
Qty: 30 CAPSULE | Refills: 1 | Status: SHIPPED | OUTPATIENT
Start: 2025-08-28 | End: 2025-10-27

## 2025-08-28 ASSESSMENT — PATIENT HEALTH QUESTIONNAIRE - PHQ9
SUM OF ALL RESPONSES TO PHQ9 QUESTIONS 1 AND 2: 1
2. FEELING DOWN, DEPRESSED OR HOPELESS: SEVERAL DAYS
1. LITTLE INTEREST OR PLEASURE IN DOING THINGS: NOT AT ALL

## 2025-08-30 LAB
C TRACH RRNA SPEC QL NAA+PROBE: NOT DETECTED
HBV SURFACE AG SERPL QL IA: NORMAL
HCV AB SERPL QL IA: NORMAL
HIV 1+2 AB+HIV1 P24 AG SERPL QL IA: NORMAL
HIV 1+2 AB+HIV1 P24 AG SERPL QL IA: NORMAL
N GONORRHOEA RRNA SPEC QL NAA+PROBE: NOT DETECTED
QUEST GC CT AMPLIFIED (ALWAYS MESSAGE): NORMAL
T PALLIDUM AB SER QL IA: NORMAL

## 2025-09-03 LAB
C TRACH RRNA SPEC QL NAA+PROBE: NOT DETECTED
HBV SURFACE AG SERPL QL IA: NORMAL
HCV AB SERPL QL IA: NORMAL
HIV 1+2 AB+HIV1 P24 AG SERPL QL IA: NORMAL
HIV 1+2 AB+HIV1 P24 AG SERPL QL IA: NORMAL
N GONORRHOEA RRNA SPEC QL NAA+PROBE: NOT DETECTED
QUEST GC CT AMPLIFIED (ALWAYS MESSAGE): NORMAL
T PALLIDUM AB SER QL IA: NEGATIVE